# Patient Record
Sex: MALE | Race: BLACK OR AFRICAN AMERICAN | NOT HISPANIC OR LATINO | URBAN - METROPOLITAN AREA
[De-identification: names, ages, dates, MRNs, and addresses within clinical notes are randomized per-mention and may not be internally consistent; named-entity substitution may affect disease eponyms.]

---

## 2022-01-19 ENCOUNTER — HOSPITAL ENCOUNTER (EMERGENCY)
Facility: HOSPITAL | Age: 52
Discharge: LEFT BEFORE DISCHARGE | End: 2022-01-19
Attending: EMERGENCY MEDICINE
Payer: MEDICAID

## 2022-01-19 VITALS
SYSTOLIC BLOOD PRESSURE: 105 MMHG | HEART RATE: 86 BPM | DIASTOLIC BLOOD PRESSURE: 57 MMHG | RESPIRATION RATE: 22 BRPM | OXYGEN SATURATION: 96 %

## 2022-01-19 DIAGNOSIS — R06.03 RESPIRATORY DISTRESS: ICD-10-CM

## 2022-01-19 DIAGNOSIS — R06.02 SOB (SHORTNESS OF BREATH): ICD-10-CM

## 2022-01-19 DIAGNOSIS — Z43.0 ENCOUNTER FOR TRACHEOSTOMY TUBE CHANGE: Primary | ICD-10-CM

## 2022-01-19 LAB
CTP QC/QA: YES
SARS-COV-2 RDRP RESP QL NAA+PROBE: NEGATIVE

## 2022-01-19 PROCEDURE — 99285 EMERGENCY DEPT VISIT HI MDM: CPT | Mod: 25

## 2022-01-19 PROCEDURE — U0002 COVID-19 LAB TEST NON-CDC: HCPCS | Performed by: PHYSICIAN ASSISTANT

## 2022-01-19 PROCEDURE — 94761 N-INVAS EAR/PLS OXIMETRY MLT: CPT

## 2022-01-19 PROCEDURE — 99284 EMERGENCY DEPT VISIT MOD MDM: CPT | Mod: CS,,, | Performed by: PHYSICIAN ASSISTANT

## 2022-01-19 PROCEDURE — 27000221 HC OXYGEN, UP TO 24 HOURS

## 2022-01-19 PROCEDURE — 94640 AIRWAY INHALATION TREATMENT: CPT

## 2022-01-19 PROCEDURE — 99284 PR EMERGENCY DEPT VISIT,LEVEL IV: ICD-10-PCS | Mod: CS,,, | Performed by: PHYSICIAN ASSISTANT

## 2022-01-19 PROCEDURE — 25000242 PHARM REV CODE 250 ALT 637 W/ HCPCS: Performed by: PHYSICIAN ASSISTANT

## 2022-01-19 RX ORDER — IPRATROPIUM BROMIDE AND ALBUTEROL SULFATE 2.5; .5 MG/3ML; MG/3ML
3 SOLUTION RESPIRATORY (INHALATION)
Status: COMPLETED | OUTPATIENT
Start: 2022-01-19 | End: 2022-01-19

## 2022-01-19 RX ADMIN — IPRATROPIUM BROMIDE AND ALBUTEROL SULFATE 3 ML: .5; 2.5 SOLUTION RESPIRATORY (INHALATION) at 05:01

## 2022-01-19 NOTE — ED NOTES
Patient identifiers verified and correct for Ariel Alanis   C/C: Tracheostomy tube came out, shortness of breath  APPEARANCE: awake and alert in NAD.  SKIN: warm, dry and intact. No breakdown or bruising.  MUSCULOSKELETAL: Patient moving all extremities spontaneously, no obvious swelling or deformities noted. Ambulates independently.  RESPIRATORY: Tracheostomy, shortness of breath.Respirations unlabored.   CARDIAC: Denies CP, 2+ distal pulses; no peripheral edema  ABDOMEN: S/ND/NT, Denies nausea  : voids spontaneously, denies difficulty  Neurologic: AAO x 4; follows commands equal strength in all extremities; denies numbness/tingling. Denies dizziness

## 2022-01-20 NOTE — ED PROVIDER NOTES
Encounter Date: 1/19/2022       History     Chief Complaint   Patient presents with    Tracheostomy Tube Change     Trach came out; pt is not in respiratory distress.      This is a 51 year old male with a PMH of HTN, PFO, COPD, HF, tracheostomy due to hypercapnic chronic respiratory failure related to obstructive sleep apnea presenting to the ED with respiratory distress. Patient reports his trach tubing was pulled out while he was leaning over the counter eating chicken. EMS was called and transported him for further evaluation. He is on 2L of oxygen at baseline. He asked to bring his home oxygen tank with him in anticipation of needing it at discharge but this was declined as EMS was prioritizing STAT transport. He is very frustrated with EMS on arrival. Respiratory has replaced the trach tubing and collar is secured prior to my interview. Patient reports resolution of his respiratory distress and states he is back at baseline. He is requesting to be discharged without further intervention. Denies fever, chills, URI symptoms, chest pain, edema. He is a smoker.        Review of patient's allergies indicates:  Not on File  No past medical history on file.  No past surgical history on file.  No family history on file.     Review of Systems   Constitutional: Negative for chills and fever.   HENT: Negative for congestion and sore throat.    Respiratory: Negative for cough and shortness of breath.    Cardiovascular: Negative for chest pain.   Gastrointestinal: Negative for nausea and vomiting.   Musculoskeletal: Negative for back pain.       Physical Exam     Initial Vitals   BP Pulse Resp Temp SpO2   01/19/22 1711 01/19/22 1706 01/19/22 1718 -- 01/19/22 1706   (!) 91/52 92 16  (!) 90 %      MAP       --                Physical Exam    Constitutional: He appears well-developed and well-nourished. No distress.   HENT:   Head: Atraumatic.   Eyes: Conjunctivae and EOM are normal. Pupils are equal, round, and reactive to  light.   Cardiovascular: Normal rate, regular rhythm and normal heart sounds.   Pulmonary/Chest: Tachypnea noted. He has decreased breath sounds. He has wheezes.     Neurological: He is alert and oriented to person, place, and time.   Skin: Skin is warm and dry. No rash noted.         ED Course   Procedures  Labs Reviewed   SARS-COV-2 RDRP GENE    Narrative:     This test utilizes isothermal nucleic acid amplification   technology to detect the SARS-CoV-2 RdRp nucleic acid segment.   The analytical sensitivity (limit of detection) is 125 genome   equivalents/mL.   A POSITIVE result implies infection with the SARS-CoV-2 virus;   the patient is presumed to be contagious.     A NEGATIVE result means that SARS-CoV-2 nucleic acids are not   present above the limit of detection. A NEGATIVE result should be   treated as presumptive. It does not rule out the possibility of   COVID-19 and should not be the sole basis for treatment decisions.   If COVID-19 is strongly suspected based on clinical and exposure   history, re-testing using an alternate molecular assay should be   considered.   This test is only for use under the Food and Drug   Administration s Emergency Use Authorization (EUA).   Commercial kits are provided by [employer].   Performance characteristics of the EUA have been independently   verified by [facility] Department of   Pathology and Laboratory Medicine.   _________________________________________________________________   The authorized Fact Sheet for Healthcare Providers and the authorized Fact   Sheet for Patients of the ID NOW COVID-19 are available on the FDA   website:     https://www.fda.gov/media/634334/download  https://www.fda.gov/media/452904/download              Imaging Results          X-Ray Chest AP Portable (Final result)  Result time 01/19/22 18:04:28    Final result by Abhijeet Pedraza MD (01/19/22 18:04:28)                 Impression:      Satisfactory position of the  tracheostomy tube tip.    Mild pulmonary interstitial edema.      Electronically signed by: Abhijeet Pedraza MD  Date:    01/19/2022  Time:    18:04             Narrative:    EXAMINATION:  XR CHEST AP PORTABLE    CLINICAL HISTORY:  SOB, trach replacement;    TECHNIQUE:  Single frontal view of the chest was performed.    COMPARISON:  None    FINDINGS:  The tracheostomy tube tip is 6.3 cm from the mable.  Monitoring EKG leads are present.    The cardiomediastinal silhouette is enlarged.  There is no evidence of free air beneath the hemidiaphragms.  There are no pleural effusions.  There is no evidence of a pneumothorax.  There is no evidence of pneumomediastinum.  There is mild pulmonary interstitial edema.  There are degenerative changes in the osseous structures.                                 Medications   albuterol-ipratropium 2.5 mg-0.5 mg/3 mL nebulizer solution 3 mL (3 mLs Nebulization Given 1/19/22 1724)     Medical Decision Making:   History:   Old Medical Records: I decided to obtain old medical records.  Old Records Summarized: records from another hospital.  Clinical Tests:   Lab Tests: Ordered and Reviewed  Radiological Study: Ordered and Reviewed       APC / Resident Notes:   51 year old patient with tracheostomy presents with trach tube problem.  Tube was replaced and secured prior to my seeing the patient. He is speaking in full sentences in no acute distress. His oxygen saturation is 90% on 2L. He is requesting to be discharged. He agreed to treatment with duonebs noting mild improvement of lung exam on reassessment. CXR shows stable placement of the tracheostomy tube, mild interstitial edema. Patient continued to exhibit hypotension and hypoxia. He insisted that the vitals were secondary to the machine malfunctioning and stated repeatedly that he wished to be discharged without further intervention. I advised him that I do not recommend leaving with his current vitals and discussed the risks of leaving  against medical advice. He verbalized understanding and signed AMA paperwork with capacity for decision making. Patient ambulated independently without difficulty out of the department.        Attending Attestation:     Physician Attestation Statement for NP/PA:   I have conducted a face to face encounter with this patient in addition to the NP/PA, due to Medical Complexity    Other NP/PA Attestation Additions:      Medical Decision Makin-year-old man who presents status post accidental trach dislodgement.  This was replaced at bedside without intervention.  Patient ultimately left AMA, and I was unable to speak with him during that conversation, but per PA evaluation, he appeared to have capacity to make this choice.                      Clinical Impression:   Final diagnoses:  [Z43.0] Encounter for tracheostomy tube change (Primary)  [R06.03] Respiratory distress  [R06.02] SOB (shortness of breath)          ED Disposition Condition    AMA               Joanie Black PA-C  [phone]       Aileen Braden MD  [id number]